# Patient Record
Sex: MALE | Race: WHITE | HISPANIC OR LATINO | Employment: FULL TIME | URBAN - METROPOLITAN AREA
[De-identification: names, ages, dates, MRNs, and addresses within clinical notes are randomized per-mention and may not be internally consistent; named-entity substitution may affect disease eponyms.]

---

## 2017-04-04 ENCOUNTER — OFFICE VISIT (OUTPATIENT)
Dept: MEDICAL GROUP | Facility: CLINIC | Age: 22
End: 2017-04-04
Payer: COMMERCIAL

## 2017-04-04 VITALS
RESPIRATION RATE: 14 BRPM | DIASTOLIC BLOOD PRESSURE: 62 MMHG | HEIGHT: 65 IN | OXYGEN SATURATION: 99 % | SYSTOLIC BLOOD PRESSURE: 108 MMHG | WEIGHT: 153 LBS | BODY MASS INDEX: 25.49 KG/M2 | HEART RATE: 68 BPM | TEMPERATURE: 99.5 F

## 2017-04-04 DIAGNOSIS — D64.9 ANEMIA, UNSPECIFIED TYPE: ICD-10-CM

## 2017-04-04 DIAGNOSIS — N50.811 TESTICULAR PAIN, RIGHT: ICD-10-CM

## 2017-04-04 PROCEDURE — 99203 OFFICE O/P NEW LOW 30 MIN: CPT | Performed by: NURSE PRACTITIONER

## 2017-04-04 RX ORDER — IBUPROFEN 600 MG/1
600 TABLET ORAL EVERY 8 HOURS PRN
Qty: 30 TAB | Refills: 0 | Status: SHIPPED | OUTPATIENT
Start: 2017-04-04 | End: 2017-04-14

## 2017-04-04 ASSESSMENT — PATIENT HEALTH QUESTIONNAIRE - PHQ9: CLINICAL INTERPRETATION OF PHQ2 SCORE: 0

## 2017-04-04 NOTE — MR AVS SNAPSHOT
"Martínez Serna   2017 5:00 PM   Office Visit   MRN: [id number]    Department:  [facility]   Dept Phone:  [phone]    Description:  Male : 1995   Provider:  RAMIN Arias           Reason for Visit     Establish Care Pain in right groin area / testicle issues;       Allergies as of 2017     No Known Allergies      You were diagnosed with     Anemia, unspecified type   [8101976]       Testicular pain, right   [[id number]]         Vital Signs     Blood Pressure Pulse Temperature Respirations Height Weight    108/62 mmHg 68 37.5 °C (99.5 °F) 14 1.641 m (5' 4.61\") 69.4 kg (153 lb)    Body Mass Index Oxygen Saturation Smoking Status             25.77 kg/m2 99% Never Smoker          Basic Information     Date Of Birth Sex Race Ethnicity Preferred Language    1995 Male White  Origin ([place], etc) [place]      Health Maintenance        Date Due Completion Dates    IMM HEP B VACCINE (1 of 3 - Primary Series) 1995 ---    IMM HEP A VACCINE (1 of 2 - Standard Series) 10/6/1997 ---    IMM HPV VACCINE (1 of 3 - Male 3 Dose Series) 10/6/2007 ---    IMM VARICELLA (CHICKENPOX) VACCINE (1 of 2 - 2 Dose Adolescent Series) 10/6/2009 ---    IMM MENINGOCOCCAL VACCINE (MCV4) (1 of 1) 10/6/2012 ---    IMM DTaP/Tdap/Td Vaccine (1 - Tdap) 10/6/2015 ---            Current Immunizations     No immunizations on file.      Below and/or attached are the medications your provider expects you to take. Review all of your home medications and newly ordered medications with your provider and/or pharmacist. Follow medication instructions as directed by your provider and/or pharmacist. Please keep your medication list with you and share with your provider. Update the information when medications are discontinued, doses are changed, or new medications (including over-the-counter products) are added; and carry medication information at all times in the event of " emergency situations     Allergies:  No Known Allergies          Medications  Valid as of: April 04, 2017 -  5:47 PM    Generic Name Brand Name Tablet Size Instructions for use    Ibuprofen (Tab) MOTRIN 600 MG Take 1 Tab by mouth every 8 hours as needed for Moderate Pain or Inflammation (with food) for up to 10 days.        .                 Medicines prescribed today were sent to:     [facility] [address] [facility] ([place]), [place] - [address] [place] [address] [facility] ([place] [place] [address]    Phone: [phone] Fax: [phone]    Open 24 Hours?: No      Medication refill instructions:       If your prescription bottle indicates you have medication refills left, it is not necessary to call your provider’s office. Please contact your pharmacy and they will refill your medication.    If your prescription bottle indicates you do not have any refills left, you may request refills at any time through one of the following ways: The online [employer] system (except Urgent Care), by calling your provider’s office, or by asking your pharmacy to contact your provider’s office with a refill request. Medication refills are processed only during regular business hours and may not be available until the next business day. Your provider may request additional information or to have a follow-up visit with you prior to refilling your medication.   *Please Note: Medication refills are assigned a new Rx number when refilled electronically. Your pharmacy may indicate that no refills were authorized even though a new prescription for the same medication is available at the pharmacy. Please request the medicine by name with the pharmacy before contacting your provider for a refill.        Your To Do List     Future Labs/Procedures Complete By Expires    CBC WITH DIFFERENTIAL  As directed 4/5/2018    CHLAMYDIA/GC PCR URINE OR SWAB  As directed 4/4/2018    COMP METABOLIC PANEL  As directed 4/5/2018    FERRITIN  As directed 4/5/2018    FOLATE  As  directed 4/5/2018    HIV ANTIBODIES  As directed 4/4/2018    IRON/TOTAL IRON BIND  As directed 4/5/2018    VITAMIN B12  As directed 4/5/2018         MyChart Access Code: Activation code not generated  Current "[employer] Status: Active

## 2017-04-05 NOTE — PROGRESS NOTES
CC: Establish Care        HPI:     Martínez presents today for the following:  Patient here to establish care.  Transfer from [facility] had all his pediatric care there including all his vaccinations as far seek recalls. He does not have any information  All problems are new to me today  Patient's past medical, family, and social history reviewed and placed in Epic today. Immunizations reviewed. Prescriptions-reviewed and entered in Epic    1. Anemia, unspecified type  Patient's uncle states he does have a history of anemia which was diagnosed one or 2 years ago. Patient states he was placed on iron for this. He denies any history of GI bleeding, black or bloody stools.  He reports he does not have any more information regarding this. They would like to have it checked again and see if there is any continued issues with this     2. Testicular pain, right  Patient states the last 5 months he's had intermittent right testicular pain. States sometimes will radiate up to the right abdomen. Occurs up to 2 times a week. Worse when walking. No discomfort when lifting heavy items or coughing or sneezing.  No injury or trauma recently however did have an injury to the groin over 2 years ago when playing sports    Denies any physical abnormalities at home when doing testicular checks    Current Outpatient Prescriptions   Medication Sig Dispense Refill   • ibuprofen (MOTRIN) 600 MG Tab Take 1 Tab by mouth every 8 hours as needed for Moderate Pain or Inflammation (with food) for up to 10 days. 30 Tab 0     No current facility-administered medications for this visit.     Social History   Substance Use Topics   • Smoking status: Never Smoker    • Smokeless tobacco: Never Used   • Alcohol Use: No     I reviewed patients allergies, problem list and medications today in EPIC.    ROS: Any/all pertinent positives listed in the HPI, otherwise all others reviewed are negative today.      /62 mmHg  Pulse 68  Temp(Src) 37.5 °C  "(99.5 °F)  Resp 14  Ht 1.641 m (5' 4.61\")  Wt 69.4 kg (153 lb)  BMI 25.77 kg/m2  SpO2 99%    Exam:    Gen: Alert and oriented, No apparent distress. WDWN  Psych: A+Ox3, normal affect and mood  Skin: Warm, dry and intact. Good turgor   No rashes in visible areas.  Eye: Conjunctiva clear, lids normal  ENMT: Lips without lesions, good dentition  Neck: No Lymphadenopathy, Thyromegaly, Bruits.   Trachea midline, no masses  Lungs: Clear to auscultation bilaterally, no rales or rhonchi   Unlabored respiratory effort.   CV: Regular rate and rhythm, S1, S2. No murmurs.   No Edema  Abd: Soft non tender, non distended. Normal active bowel sounds.    No Hepatosplenomegaly, No pulsatile masses.   : Uncircumcised, no penile drainage or discharge.  No testicular masses or nodules. No tenderness on exam. No scrotal abnormalities. No hernia appreciated      Assessment and Plan.   21 y.o. male with the following issues.    1. Anemia, unspecified type  Stable. Monitor labs  - CBC WITH DIFFERENTIAL; Future  - VITAMIN B12; Future  - FOLATE; Future  - IRON/TOTAL IRON BIND; Future  - FERRITIN; Future  - COMP METABOLIC PANEL; Future    2. Testicular pain, right  Stable. Normal exam. Will try a trial of ibuprofen one pill 3 times a day with food to see if this helps. If no improvement he'll notify me and I will order scrotal ultrasound.  - RPR  - CHLAMYDIA/GC PCR URINE OR SWAB; Future  - HIV ANTIBODIES; Future  - ibuprofen (MOTRIN) 600 MG Tab; Take 1 Tab by mouth every 8 hours as needed for Moderate Pain or Inflammation (with food) for up to 10 days.  Dispense: 30 Tab; Refill: 0        "

## 2017-04-29 LAB
ALBUMIN SERPL-MCNC: 4.5 G/DL (ref 3.5–5.5)
ALBUMIN/GLOB SERPL: 2 {RATIO} (ref 1.2–2.2)
ALP SERPL-CCNC: 89 IU/L (ref 39–117)
ALT SERPL-CCNC: 18 IU/L (ref 0–44)
AST SERPL-CCNC: 22 IU/L (ref 0–40)
BASOPHILS # BLD AUTO: 0 X10E3/UL (ref 0–0.2)
BASOPHILS NFR BLD AUTO: 1 %
BILIRUB SERPL-MCNC: 0.7 MG/DL (ref 0–1.2)
BUN SERPL-MCNC: 12 MG/DL (ref 6–20)
BUN/CREAT SERPL: 14 (ref 9–20)
CALCIUM SERPL-MCNC: 9.7 MG/DL (ref 8.7–10.2)
CHLORIDE SERPL-SCNC: 100 MMOL/L (ref 96–106)
CO2 SERPL-SCNC: 24 MMOL/L (ref 18–29)
CREAT SERPL-MCNC: 0.83 MG/DL (ref 0.76–1.27)
EOSINOPHIL # BLD AUTO: 0.1 X10E3/UL (ref 0–0.4)
EOSINOPHIL NFR BLD AUTO: 1 %
ERYTHROCYTE [DISTWIDTH] IN BLOOD BY AUTOMATED COUNT: 13.7 % (ref 12.3–15.4)
FERRITIN SERPL-MCNC: 48 NG/ML (ref 30–400)
FOLATE SERPL-MCNC: 13.5 NG/ML
GLOBULIN SER CALC-MCNC: 2.3 G/DL (ref 1.5–4.5)
GLUCOSE SERPL-MCNC: 61 MG/DL (ref 65–99)
HCT VFR BLD AUTO: 44.7 % (ref 37.5–51)
HGB BLD-MCNC: 15.5 G/DL (ref 12.6–17.7)
HIV 1+2 AB+HIV1 P24 AG SERPL QL IA: NON REACTIVE
IMM GRANULOCYTES # BLD: 0 X10E3/UL (ref 0–0.1)
IMM GRANULOCYTES NFR BLD: 0 %
IMMATURE CELLS  115398: NORMAL
IRON SATN MFR SERPL: 30 % (ref 15–55)
IRON SERPL-MCNC: 107 UG/DL (ref 38–169)
LYMPHOCYTES # BLD AUTO: 2.1 X10E3/UL (ref 0.7–3.1)
LYMPHOCYTES NFR BLD AUTO: 32 %
MCH RBC QN AUTO: 33 PG (ref 26.6–33)
MCHC RBC AUTO-ENTMCNC: 34.7 G/DL (ref 31.5–35.7)
MCV RBC AUTO: 95 FL (ref 79–97)
MONOCYTES # BLD AUTO: 0.4 X10E3/UL (ref 0.1–0.9)
MONOCYTES NFR BLD AUTO: 5 %
MORPHOLOGY BLD-IMP: NORMAL
NEUTROPHILS # BLD AUTO: 4.2 X10E3/UL (ref 1.4–7)
NEUTROPHILS NFR BLD AUTO: 61 %
NRBC BLD AUTO-RTO: NORMAL %
PLATELET # BLD AUTO: 329 X10E3/UL (ref 150–379)
POTASSIUM SERPL-SCNC: 4.3 MMOL/L (ref 3.5–5.2)
PROT SERPL-MCNC: 6.8 G/DL (ref 6–8.5)
RBC # BLD AUTO: 4.7 X10E6/UL (ref 4.14–5.8)
RPR SER QL: NON REACTIVE
SODIUM SERPL-SCNC: 141 MMOL/L (ref 134–144)
TIBC SERPL-MCNC: 353 UG/DL (ref 250–450)
UIBC SERPL-MCNC: 246 UG/DL (ref 111–343)
VIT B12 SERPL-MCNC: 334 PG/ML (ref 211–946)
WBC # BLD AUTO: 6.8 X10E3/UL (ref 3.4–10.8)